# Patient Record
Sex: FEMALE | Race: BLACK OR AFRICAN AMERICAN | Employment: OTHER | URBAN - METROPOLITAN AREA
[De-identification: names, ages, dates, MRNs, and addresses within clinical notes are randomized per-mention and may not be internally consistent; named-entity substitution may affect disease eponyms.]

---

## 2021-12-28 ENCOUNTER — APPOINTMENT (OUTPATIENT)
Dept: GENERAL RADIOLOGY | Age: 76
End: 2021-12-28
Attending: EMERGENCY MEDICINE
Payer: MEDICARE

## 2021-12-28 ENCOUNTER — HOSPITAL ENCOUNTER (EMERGENCY)
Age: 76
Discharge: HOME | End: 2021-12-28
Attending: EMERGENCY MEDICINE
Payer: MEDICARE

## 2021-12-28 VITALS
DIASTOLIC BLOOD PRESSURE: 62 MMHG | OXYGEN SATURATION: 97 % | TEMPERATURE: 98 F | RESPIRATION RATE: 20 BRPM | WEIGHT: 207 LBS | SYSTOLIC BLOOD PRESSURE: 167 MMHG | HEART RATE: 85 BPM | BODY MASS INDEX: 36.68 KG/M2 | HEIGHT: 63 IN

## 2021-12-28 DIAGNOSIS — Z20.822 ENCOUNTER FOR LABORATORY TESTING FOR COVID-19 VIRUS: Primary | ICD-10-CM

## 2021-12-28 LAB
SARS-COV-2, COV2: NORMAL
SARS-COV-2, NAA: NOT DETECTED

## 2021-12-28 PROCEDURE — 99283 EMERGENCY DEPT VISIT LOW MDM: CPT

## 2021-12-28 PROCEDURE — U0003 INFECTIOUS AGENT DETECTION BY NUCLEIC ACID (DNA OR RNA); SEVERE ACUTE RESPIRATORY SYNDROME CORONAVIRUS 2 (SARS-COV-2) (CORONAVIRUS DISEASE [COVID-19]), AMPLIFIED PROBE TECHNIQUE, MAKING USE OF HIGH THROUGHPUT TECHNOLOGIES AS DESCRIBED BY CMS-2020-01-R: HCPCS

## 2021-12-28 NOTE — Clinical Note
Grace Green was seen and treated in our emergency department on 12/28/2021.     Related to POCT PCR [facility] (FLU A/B, RSV, COVID19)  Component    Influenza A by PCR  Influenza B by PCR  RSV by PCR  SARS-CoV-2, by PCR  SARS-CoV-2 Resulting Lab    Component 12/26/2021     Influenza A by PCR Not Detected  Influenza B by PCR Not Detected  RSV by PCR Not Detected  SARS-CoV-2, by PCR Not Detected  SARS-CoV-2 Resulting Lab [facility] - ([facility]#[id number])        Lashell Land DO

## 2021-12-28 NOTE — ED TRIAGE NOTES
Pt arrives ambulatory to ED with c\o SOB and back pain x 3 days, pt sts she was exposed to positive covid 5 days ago

## 2021-12-28 NOTE — Clinical Note
Yash Gamez was seen and treated in our emergency department on 12/28/2021.     Related to POCT PCR [facility] (FLU A/B, RSV, COVID19)  Component    Influenza A by PCR  Influenza B by PCR  RSV by PCR  SARS-CoV-2, by PCR  SARS-CoV-2 Resulting Lab    Component 12/26/2021     Influenza A by PCR Not Detected  Influenza B by PCR Not Detected  RSV by PCR Not Detected  SARS-CoV-2, by PCR Not Detected  SARS-CoV-2 Resulting Lab [facility] - ([facility]#[id number])    Sirena Cummings DO

## 2021-12-28 NOTE — ED PROVIDER NOTES
EMERGENCY DEPARTMENT HISTORY AND PHYSICAL EXAM    11:02 AM    Date: 12/28/2021  Patient Name: Irish Prieto    History of Presenting Illness     Chief Complaint   Patient presents with    Concern For [id number] (Coronavirus)       History Provided By: Patient  Location/Duration/Severity/Modifying factors   Is a 73-year-old female with history of diabetes presents to the ED for Covid testing. Patient reports she is been staying with her family who has had Covid. She is trying to go back to [place] and she is looking for a Covid test.  On chart review she received a Covid test 2 days ago through [facility] that was negative for Covid and influenza PCR evidently a PCR rapid test.  She is unsure if she has symptoms or not, she reports she feels somewhat short of breath but she is not sure if that is \"all in my head\". She denies any vomiting or diarrhea. Denies any chest pain. She also feeling her anxiety is getting worked up. She declines any testing aside from a Covid test.  Her symptoms is mild and she wonders if it may not be real at all. PCP: Esetfani Lovell, Not On File, MD        Past History     Past Medical History:  No past medical history on file. Past Surgical History:  No past surgical history on file. Family History:  No family history on file. Social History:  Social History     Tobacco Use    Smoking status: Not on file    Smokeless tobacco: Not on file   Substance Use Topics    Alcohol use: Not on file    Drug use: Not on file       Allergies:  No Known Allergies    I reviewed and confirmed the above information with patient and updated as necessary. Review of Systems     Review of Systems   Constitutional: Negative for chills and fever. HENT: Negative for congestion, rhinorrhea, sinus pressure and sneezing. Eyes: Negative for visual disturbance. Respiratory: Positive for shortness of breath. Negative for cough. Cardiovascular: Negative for chest pain. Gastrointestinal: Negative for abdominal pain, diarrhea, nausea and vomiting. Genitourinary: Negative for dysuria, frequency and urgency. Musculoskeletal: Negative for back pain and neck pain. Skin: Negative for rash. Neurological: Negative for syncope, numbness and headaches. Physical Exam     Visit Vitals  BP (!) 167/62 (BP 1 Location: Left upper arm, BP Patient Position: Sitting)   Pulse 85   Temp 98 °F (36.7 °C)   Resp 20   Ht 5' 3\" (1.6 m)   Wt 93.9 kg (207 lb)   SpO2 97%   BMI 36.67 kg/m²       Physical Exam  Vitals and nursing note reviewed. Constitutional:       General: She is not in acute distress. Appearance: Normal appearance. She is normal weight. HENT:      Head: Normocephalic and atraumatic. Right Ear: External ear normal.      Left Ear: External ear normal.      Nose: Nose normal. No congestion or rhinorrhea. Mouth/Throat:      Mouth: Mucous membranes are moist.      Pharynx: Oropharynx is clear. No oropharyngeal exudate or posterior oropharyngeal erythema. Eyes:      Conjunctiva/sclera: Conjunctivae normal.      Pupils: Pupils are equal, round, and reactive to light. Cardiovascular:      Rate and Rhythm: Normal rate and regular rhythm. Pulses: Normal pulses. Heart sounds: Normal heart sounds. No murmur heard. Pulmonary:      Effort: Pulmonary effort is normal.      Breath sounds: Normal breath sounds. No wheezing, rhonchi or rales. Comments: Even and unlabored, no respiratory distress. Nontoxic. Abdominal:      General: Abdomen is flat. Tenderness: There is no abdominal tenderness. There is no guarding or rebound. Musculoskeletal:         General: No swelling or tenderness. Normal range of motion. Cervical back: Normal range of motion and neck supple. Right lower leg: No edema. Left lower leg: No edema. Skin:     General: Skin is warm and dry. Capillary Refill: Capillary refill takes less than 2 seconds. Findings: No rash. Neurological:      General: No focal deficit present. Mental Status: She is alert. Cranial Nerves: No cranial nerve deficit. Sensory: No sensory deficit. Motor: No weakness. Diagnostic Study Results     Labs -  Recent Results (from the past 24 hour(s))   SARS-COV-2    Collection Time: 12/28/21 10:45 AM   Result Value Ref Range    SARS-CoV-2 Please find results under separate order           Radiologic Studies -   No orders to display           Medical Decision Making   I am the first provider for this patient. I reviewed the vital signs, available nursing notes, past medical history, past surgical history, family history and social history. Vital Signs-Reviewed the patient's vital signs. Records Reviewed: Nursing Notes, Old Medical Records and Previous Laboratory Studies (Time of Review: 11:02 AM)      Provider Notes (Medical Decision Making):   MDM  Number of Diagnoses or Management Options  Encounter for laboratory testing for COVID-19 virus  Diagnosis management comments: Patient is a 28-year-old female presenting for Covid test.  She declines any additional testing aside from a Covid test.  Discussed with her that if she is feeling short of breath there may be an alternative cause is not Covid and we should investigate however she declines. She wants this for travel purposes advised her we would not do a rapid test in that circumstance. She already had a test done in fact. Will send for another PCR test that she is continually exposed to the people that in her home that have Covid however the patient reports that she has had both vaccines and been boosted.     At this time, patient is stable and appropriate for discharge home.  Patient demonstrates understanding of current diagnoses and is in agreement with the treatment plan. Lloyd Hun are advised that while the likelihood of serious underlying condition is low at this point given the evaluation performed today, we cannot fully rule it out. Francia Renee are advised to immediately return with any new symptoms or worsening of current condition. Any Incidental findings were noted on the patient's discharge paperwork as well as verbally directly to the patient, and the appropriate follow-up was given to the patient as far as instructions on testing needed as well as the timeframe.  All questions have been answered. Annette Bedolla is given educational material regarding their diagnoses, including danger symptoms and when to return to the ED. This note was dictated utilizing Dragon voice recognition software. Unfortunately this leads to occasional typographical errors. I apologize in advance if the situation occurs. If questions occur please do not hesitate to contact me directly. Leilani Luong DO          ED Course: Progress Notes, Reevaluation, and Consults:       Procedures    Critical Care Time: N/A    Diagnosis     Clinical Impression:   1. Encounter for laboratory testing for COVID-19 virus        Disposition: Discharge    Follow-up Information     Follow up With Specialties Details Why Contact Info    Your Primary Care Physician  In 3 days      [facility] EMERGENCY DE[facility] Emergency Medicine  As needed, If symptoms worsen; or [facility] Emergency Department [address] [facility]  [phone]           Patient's Medications    No medications on file       Leilani Luong DO   Emergency Medicine   December 28, 2021, 11:02 AM     This note is dictated utilizing Dragon voice recognition software. Unfortunately this leads to occasional typographical errors using the voice recognition. I apologize in advance if the situation occurs. If questions occur please do not hesitate to contact me directly. Patient was seen  and treated during the context of the COVID-19 pandemic.   Contemporary protocols utilized based on the best available evidence, utilizing evolving public health  guidelines and treatment protocols.     Shania Baker,

## 2021-12-28 NOTE — DISCHARGE INSTRUCTIONS
1.  If you reconsider not wanting any testing other than a Covid test or you are feeling worse you should return or seek medical care. 2.  Your test from [facility] at the urgent care was negative and that is a PCR test.  3.  Your test from today should be available in 1-4 business days and you may find the results on [employer] which you can set up an account using the instructions attached to this discharge paperwork.